# Patient Record
Sex: MALE | Race: WHITE
[De-identification: names, ages, dates, MRNs, and addresses within clinical notes are randomized per-mention and may not be internally consistent; named-entity substitution may affect disease eponyms.]

---

## 2017-04-21 ENCOUNTER — HOSPITAL ENCOUNTER (EMERGENCY)
Dept: HOSPITAL 82 - ED | Age: 77
Discharge: HOME | End: 2017-04-21
Payer: MEDICARE

## 2017-04-21 VITALS — BODY MASS INDEX: 32.72 KG/M2 | HEIGHT: 71 IN | WEIGHT: 233.69 LBS

## 2017-04-21 VITALS — SYSTOLIC BLOOD PRESSURE: 161 MMHG | DIASTOLIC BLOOD PRESSURE: 97 MMHG

## 2017-04-21 DIAGNOSIS — S81.812A: Primary | ICD-10-CM

## 2017-04-21 DIAGNOSIS — R94.31: ICD-10-CM
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
## 2017-04-21 DIAGNOSIS — I10: ICD-10-CM
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
## 2017-04-21 LAB
ALBUMIN SERPL-MCNC: 4.4 G/DL (ref 3.2–5)
ALP SERPL-CCNC: 56 U/L (ref 38–126)
ALT SERPL-CCNC: 25 U/L (ref 11–66)
ANION GAP SERPL CALCULATED.3IONS-SCNC: 17 MMOL/L
AST SERPL-CCNC: 22 U/L (ref 19–48)
BASOPHILS NFR BLD AUTO: 1 % (ref 0–3)
BUN SERPL-MCNC: 13 MG/DL (ref 8–23)
BUN/CREAT SERPL: 15
CALCIUM SERPL-MCNC: 9.7 MG/DL (ref 8.4–10.2)
CHLORIDE SERPL-SCNC: 104 MMOL/L (ref 95–108)
CO2 SERPL-SCNC: 23 MMOL/L (ref 22–30)
CREAT SERPL-MCNC: 0.9 MG/DL (ref 0.7–1.3)
EOSINOPHIL NFR BLD AUTO: 1 % (ref 0–8)
ERYTHROCYTE [DISTWIDTH] IN BLOOD BY AUTOMATED COUNT: 13.8 % (ref 11.5–15.5)
GLUCOSE SERPL-MCNC: 104 MG/DL (ref 82–115)
HCT VFR BLD AUTO: 58.2 % (ref 39–50)
HGB BLD-MCNC: 19.9 G/DL (ref 14–18)
IMM GRANULOCYTES NFR BLD: 0.5 % (ref 0–1)
LYMPHOCYTES NFR BLD: 30 % (ref 15–41)
MCH RBC QN AUTO: 31.3 PG  CALC (ref 26–32)
MCHC RBC AUTO-ENTMCNC: 34.2 G/L CALC (ref 32–36)
MCV RBC AUTO: 91.7 FL  CALC (ref 80–100)
MONOCYTES NFR BLD AUTO: 12 % (ref 2–13)
MYOGLOBIN SERPL-MCNC: 52 NG/ML (ref 0–121)
NEUTROPHILS # BLD AUTO: 4.55 THOU/UL (ref 1.82–7.42)
NEUTROPHILS NFR BLD AUTO: 55 % (ref 42–76)
PLATELET # BLD AUTO: 156 THOU/UL (ref 130–400)
POTASSIUM SERPL-SCNC: 4 MMOL/L (ref 3.5–5.1)
PROT SERPL-MCNC: 7.8 G/DL (ref 6.3–8.2)
RBC # BLD AUTO: 6.35 MILL/UL (ref 4.7–6.1)
SODIUM SERPL-SCNC: 140 MMOL/L (ref 137–146)

## 2017-04-21 PROCEDURE — 0JQP0ZZ REPAIR LEFT LOWER LEG SUBCUTANEOUS TISSUE AND FASCIA, OPEN APPROACH: ICD-10-PCS | Performed by: EMERGENCY MEDICINE

## 2017-04-22 ENCOUNTER — HOSPITAL ENCOUNTER (EMERGENCY)
Dept: HOSPITAL 82 - ED | Age: 77
Discharge: HOME | End: 2017-04-22
Payer: MEDICARE

## 2017-04-22 VITALS — HEIGHT: 71 IN | WEIGHT: 235.45 LBS | BODY MASS INDEX: 32.96 KG/M2

## 2017-04-22 VITALS — DIASTOLIC BLOOD PRESSURE: 86 MMHG | SYSTOLIC BLOOD PRESSURE: 143 MMHG

## 2017-04-22 DIAGNOSIS — I10: ICD-10-CM
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
## 2017-04-22 DIAGNOSIS — S81.812D: Primary | ICD-10-CM

## 2017-04-28 ENCOUNTER — HOSPITAL ENCOUNTER (EMERGENCY)
Dept: HOSPITAL 82 - ED | Age: 77
Discharge: HOME | End: 2017-04-28
Payer: MEDICARE

## 2017-04-28 VITALS — WEIGHT: 233.69 LBS | HEIGHT: 71 IN | BODY MASS INDEX: 32.72 KG/M2

## 2017-04-28 VITALS — SYSTOLIC BLOOD PRESSURE: 123 MMHG | DIASTOLIC BLOOD PRESSURE: 77 MMHG

## 2017-04-28 DIAGNOSIS — S81.812D: Primary | ICD-10-CM

## 2017-04-28 DIAGNOSIS — L08.9: ICD-10-CM
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
## 2017-04-28 DIAGNOSIS — M79.662: ICD-10-CM

## 2017-04-28 DIAGNOSIS — M79.89: ICD-10-CM

## 2020-04-25 ENCOUNTER — HOSPITAL ENCOUNTER (OUTPATIENT)
Dept: HOSPITAL 82 - ED | Age: 80
Setting detail: OBSERVATION
LOS: 2 days | Discharge: HOME | End: 2020-04-27
Attending: INTERNAL MEDICINE | Admitting: INTERNAL MEDICINE
Payer: MEDICARE

## 2020-04-25 VITALS — SYSTOLIC BLOOD PRESSURE: 114 MMHG | DIASTOLIC BLOOD PRESSURE: 66 MMHG

## 2020-04-25 VITALS — DIASTOLIC BLOOD PRESSURE: 71 MMHG | SYSTOLIC BLOOD PRESSURE: 140 MMHG

## 2020-04-25 VITALS — WEIGHT: 202.83 LBS | BODY MASS INDEX: 28.4 KG/M2 | HEIGHT: 71 IN

## 2020-04-25 DIAGNOSIS — E03.9: ICD-10-CM

## 2020-04-25 DIAGNOSIS — N39.0: Primary | ICD-10-CM

## 2020-04-25 DIAGNOSIS — I10: ICD-10-CM

## 2020-04-25 DIAGNOSIS — B96.89: ICD-10-CM

## 2020-04-25 DIAGNOSIS — Z20.828: ICD-10-CM

## 2020-04-25 LAB
ALBUMIN SERPL-MCNC: 4.4 G/DL (ref 3.2–5)
ALP SERPL-CCNC: 71 U/L (ref 38–126)
ANION GAP SERPL CALCULATED.3IONS-SCNC: 15 MMOL/L
AST SERPL-CCNC: 50 U/L (ref 19–48)
BACTERIA #/AREA URNS HPF: (no result) HPF
BASOPHILS NFR BLD AUTO: 0 % (ref 0–3)
BILIRUB UR QL STRIP.AUTO: NEGATIVE
BUN SERPL-MCNC: 13 MG/DL (ref 8–23)
BUN/CREAT SERPL: 16
CHLORIDE SERPL-SCNC: 103 MMOL/L (ref 95–108)
CO2 SERPL-SCNC: 20 MMOL/L (ref 22–30)
COLOR UR AUTO: YELLOW
CREAT SERPL-MCNC: 0.8 MG/DL (ref 0.7–1.3)
EOSINOPHIL NFR BLD AUTO: 0 % (ref 0–8)
ERYTHROCYTE [DISTWIDTH] IN BLOOD BY AUTOMATED COUNT: 14.4 % (ref 11.5–15.5)
GLUCOSE UR STRIP.AUTO-MCNC: NEGATIVE MG/DL
HCT VFR BLD AUTO: 52.1 % (ref 39–50)
HGB BLD-MCNC: 17.6 G/DL (ref 14–18)
HGB UR QL STRIP.AUTO: (no result)
IMM GRANULOCYTES NFR BLD: 0.3 % (ref 0–5)
KETONES UR STRIP.AUTO-MCNC: (no result) MG/DL
LEUKOCYTE ESTERASE UR QL STRIP.AUTO: (no result)
LYMPHOCYTES NFR BLD: 9 % (ref 15–41)
MCH RBC QN AUTO: 30.7 PG  CALC (ref 26–32)
MCHC RBC AUTO-ENTMCNC: 33.8 G/DL CAL (ref 32–36)
MCV RBC AUTO: 90.8 FL  CALC (ref 80–100)
MONOCYTES NFR BLD AUTO: 9 % (ref 2–13)
NEUTROPHILS # BLD AUTO: 12.99 THOU/UL (ref 1.82–7.42)
NEUTROPHILS NFR BLD AUTO: 82 % (ref 42–76)
NITRITE UR QL STRIP.AUTO: POSITIVE
PH UR STRIP.AUTO: 6 [PH] (ref 4.5–8)
PLATELET # BLD AUTO: 198 THOU/UL (ref 130–400)
POTASSIUM SERPL-SCNC: 4.1 MMOL/L (ref 3.5–5.1)
PROT SERPL-MCNC: 7.5 G/DL (ref 6.3–8.2)
PROT UR QL STRIP.AUTO: NEGATIVE MG/DL
RBC # BLD AUTO: 5.74 MILL/UL (ref 4.7–6.1)
SODIUM SERPL-SCNC: 134 MMOL/L (ref 137–146)
SP GR UR STRIP.AUTO: 1.02
SQUAMOUS URNS QL MICRO: (no result) EPI/HPF
UROBILINOGEN UR QL STRIP.AUTO: 0.2 E.U./DL
WBC #/AREA URNS HPF: (no result) WBC/HPF (ref 0–5)

## 2020-04-25 PROCEDURE — G0378 HOSPITAL OBSERVATION PER HR: HCPCS

## 2020-04-25 NOTE — NUR
PATIENT ADMITTED FROM ER VIA WHEELCHAIR WITH ER STAFF IN ATTENDACE. PATIENT
ABLE TO TRANSFER TO BED. FACE IS FLUSHED. AWAKE ALERT AND ORIENTEDX3. PATIENT
PLACED ON ISOLATION IN NEG PRESSURE ROOM. RAPID IGM AND IgG NEG BUT PATIENT
WITH FEVER. NASAL SWAB WAS DONE WITH NO RESULTS AT THIS TIME. PATIENT WITH
IV SITE TO LEFT HAND. AZITHROMYCIN INFUSING AS ORDERED, SITE IS HEALTHY AT
THIS TIME. PATIENT STATES THAT HE STARTED WITH HIGH FEVER THIS AFTERNOON AND
CAME TO THE ER. ASSESSMENT COMPLETED. PATIENT ORIENTED TO ROOM AND
SURROUNDINGS. INSTRUCTED ON USE OF NURSE CALL LIGHT SYSTEM, TV REMOTE AND
PHONE. SAFETY PRECAUTIONS REINFORCED. CALL LIGHT IN REACH. WILL CONT TO
MONITOR.

## 2020-04-25 NOTE — NUR
A/O M WITH SUDDEN FEVER TODAY  AT HOME AND NP COUGH NO SORE THROAT NO
DYSPNEA NO DIFF SWALLOWING.TOOK NO ANTIPYRETICS BUT DID TAKE ZITHROMAX AND
PLAQUENIL AT NOON.W/FR FACE AND DRY SKIN.CLEAR BILAT BREATH SOUNDS NON-SMOKER.

## 2020-04-26 VITALS — SYSTOLIC BLOOD PRESSURE: 141 MMHG | DIASTOLIC BLOOD PRESSURE: 76 MMHG

## 2020-04-26 VITALS — SYSTOLIC BLOOD PRESSURE: 113 MMHG | DIASTOLIC BLOOD PRESSURE: 68 MMHG

## 2020-04-26 VITALS — SYSTOLIC BLOOD PRESSURE: 150 MMHG | DIASTOLIC BLOOD PRESSURE: 85 MMHG

## 2020-04-26 VITALS — DIASTOLIC BLOOD PRESSURE: 82 MMHG | SYSTOLIC BLOOD PRESSURE: 142 MMHG

## 2020-04-26 LAB
ANION GAP SERPL CALCULATED.3IONS-SCNC: 11 MMOL/L
BASOPHILS NFR BLD AUTO: 0 % (ref 0–3)
BUN SERPL-MCNC: 14 MG/DL (ref 8–23)
BUN/CREAT SERPL: 16
CHLORIDE SERPL-SCNC: 104 MMOL/L (ref 95–108)
CO2 SERPL-SCNC: 25 MMOL/L (ref 22–30)
CREAT SERPL-MCNC: 0.9 MG/DL (ref 0.7–1.3)
EOSINOPHIL NFR BLD AUTO: 0 % (ref 0–8)
ERYTHROCYTE [DISTWIDTH] IN BLOOD BY AUTOMATED COUNT: 14.6 % (ref 11.5–15.5)
HCT VFR BLD AUTO: 50.5 % (ref 39–50)
HGB BLD-MCNC: 16.5 G/DL (ref 14–18)
IMM GRANULOCYTES NFR BLD: 0.5 % (ref 0–5)
LYMPHOCYTES NFR BLD: 10 % (ref 15–41)
MCH RBC QN AUTO: 30.3 PG  CALC (ref 26–32)
MCHC RBC AUTO-ENTMCNC: 32.7 G/DL CAL (ref 32–36)
MCV RBC AUTO: 92.8 FL  CALC (ref 80–100)
MONOCYTES NFR BLD AUTO: 10 % (ref 2–13)
NEUTROPHILS # BLD AUTO: 9.96 THOU/UL (ref 1.82–7.42)
NEUTROPHILS NFR BLD AUTO: 80 % (ref 42–76)
PLATELET # BLD AUTO: 161 THOU/UL (ref 130–400)
POTASSIUM SERPL-SCNC: 3.9 MMOL/L (ref 3.5–5.1)
RBC # BLD AUTO: 5.44 MILL/UL (ref 4.7–6.1)
SODIUM SERPL-SCNC: 136 MMOL/L (ref 137–146)

## 2020-04-26 NOTE — NUR
PT LAYING IN BED. NO DISTRESS NOTED. NO OTHER NEEDS AT THIS TIME. CALL LIGHT
IN REACH. CONTINUE TO MONITOR.

## 2020-04-26 NOTE — NUR
PATIENT RESTING IN BED-MEDICATED WITH ROCEPHIN AS ORDERED VIA LEFT AC IV SITE.
SITE IS HEALTHY AT THIS TIME. NO COMPLAINTS AT THIS TIME. CALL LIGHT IN REACH.
WILL CONT TO MONITOR.

## 2020-04-26 NOTE — NUR
PATIENT RESTING IN BED AT THIS TIME-STATES THAT HE IS FEELING BETTER THIS
MPORNING. AFEBRILE THIS MORNING. IVF PATENT AND INFUSING VIA LAC SITE AS
ORDERED. LAB WORK DRAWN VIA RAC SITE WITH GOOD BLOOD RETURN, ISOLATION AND NEG
PRESSURE ROOM RMAINS IN PLACE. CALL LIGHT IN REACH. WILL CONT TO MONITOR.

## 2020-04-26 NOTE — NUR
PATIENT APPEARS SLEEPING POSITIONED ON LEFT SIDE. RESP ARE EVEN AND UNLABORED.
VOIDED 200CC OF ANUP URINE IN URINAL. IVF PATENT ANDINFUSING VIA LAC AS
ORDERED. CALL LIGHT IN REACH. WILL CONT TO MONITOR.

## 2020-04-26 NOTE — NUR
PATIENT RESTING IN BED AT THIS TIME.  AWAKE ALERT AND ORIENTEDX3 WITH NO
COMPLAINTS OR CONCERNS AT THIS TIME. VS TAKEN AND RECORDED. AFEBRILE AT THIS
TIME. VOIDING YELLOW  URINE IN URINAL.  IVF NS PATENT AND INFUSING VIA LEFT
HAND SITE A5T 125CC/HR. SAFETY PRECUAITONS REINFORCED. CALL LIGHT IN REACH.
WILL CONT TO MONITOR.

## 2020-04-26 NOTE — NUR
PT SLEEPING IN BED AWAKENED TO COMPLETE ASSESSMENT. A&O X3. NO DISTRESS NOTED,
O2 VIA NC @2L IN PLACE. PER PT HE IS NOT O2 DEPENDENT AT HOME. ALSO STATES
THAT HE WOULD PREFER TO SPEAK TO THE PHYSICIAN IN REGARDS TO CONTINUING HIS
PLAQUENIL. HE STATES THAT HE STARTED TAKING IT YESTERDAY DUE TO HIS SYMPTOMS.
NARCOLEPSY EPISODE NOTED. PT ENGAGED BACK INTO CONVERSATION SHORTLY AFTER. PT
STATES THAT HE WOULD PREFER TO BE WITHOUT O2, O2 % 95 WITHOUT O2. O2 LEFT AT
BEDSIDE IN CASE IT IS NEEDED. ASSESSMENT COMPLETED. DISCUSSED POC. CALL LIGHT
IN REACH. CONTINUE TO MONITOR. ISOLATION PERCAUTIONS IN PLACE AND EXPLAINED.

## 2020-04-26 NOTE — NUR
WIFE BROUGHT IN PRESCRIPTION OF LISBETH THYROID MEDICATION THAT PT TAKES EVERY
DAY. MEDICATION TO BE SENT TO PHARMACY TO BE PROFILED.

## 2020-04-27 VITALS — SYSTOLIC BLOOD PRESSURE: 144 MMHG | DIASTOLIC BLOOD PRESSURE: 85 MMHG

## 2020-04-27 VITALS — DIASTOLIC BLOOD PRESSURE: 67 MMHG | SYSTOLIC BLOOD PRESSURE: 119 MMHG

## 2020-04-27 VITALS — DIASTOLIC BLOOD PRESSURE: 85 MMHG | SYSTOLIC BLOOD PRESSURE: 144 MMHG

## 2020-04-27 NOTE — NUR
PT AWAKE, ALERT, ORIENTED X 3.  LUNGS CLEAR, DECREASED BASES.  NO ELEVATED
TEMPERATURE, 98.5.  PT FEELS BETTER.
PT HAS BEEN DISCHARGED TO HOME.  PT VERBALIZED UNDERSTANDING OF DC
INSTRUCTIONS, TAKEN TO BENCH OUTSIDE BY WHEELCHAIR TO WAIT FOR HIS WIFE.  PT
LEAVES [facility] IN STABLE CONDITION.

## 2020-04-27 NOTE — NUR
PATIENT RESTING IN BED-POSITIONED ON RIGHT SITE-APPEARS SLEEPING WITH EYES
CLOSED.  RESP ARE EVEN AND UNLABORED. IVF NS PATENT AND INFUSING VIA LEFT AC
SITE AT 125CC/HR. SITE IS HEALTHY. VOIDING QS YELLOW URINE IN URINAL. CALL
LIGHT IN REACH. WILL CONT TO MONITOR.

## 2020-04-28 NOTE — NUR
Notifed patient of Covid results (Negative). Advised patient to follow up with
PCP or return to ED for urgent needs. Advised patient to continue practicing
Covid prevention. Patient verbalized understanding.

## 2022-05-21 ENCOUNTER — HOSPITAL ENCOUNTER (INPATIENT)
Dept: HOSPITAL 82 - ED | Age: 82
LOS: 2 days | Discharge: HOME | DRG: 336 | End: 2022-05-23
Attending: INTERNAL MEDICINE | Admitting: INTERNAL MEDICINE
Payer: MEDICARE

## 2022-05-21 VITALS — SYSTOLIC BLOOD PRESSURE: 112 MMHG | DIASTOLIC BLOOD PRESSURE: 75 MMHG

## 2022-05-21 VITALS — SYSTOLIC BLOOD PRESSURE: 128 MMHG | DIASTOLIC BLOOD PRESSURE: 90 MMHG

## 2022-05-21 VITALS — SYSTOLIC BLOOD PRESSURE: 99 MMHG | DIASTOLIC BLOOD PRESSURE: 68 MMHG

## 2022-05-21 VITALS — WEIGHT: 253.56 LBS | HEIGHT: 71 IN | BODY MASS INDEX: 35.5 KG/M2

## 2022-05-21 VITALS — SYSTOLIC BLOOD PRESSURE: 127 MMHG | DIASTOLIC BLOOD PRESSURE: 89 MMHG

## 2022-05-21 VITALS — DIASTOLIC BLOOD PRESSURE: 77 MMHG | SYSTOLIC BLOOD PRESSURE: 106 MMHG

## 2022-05-21 DIAGNOSIS — Z20.822: ICD-10-CM

## 2022-05-21 DIAGNOSIS — B96.1: ICD-10-CM

## 2022-05-21 DIAGNOSIS — N39.0: ICD-10-CM

## 2022-05-21 DIAGNOSIS — Z87.440: ICD-10-CM

## 2022-05-21 DIAGNOSIS — E03.9: ICD-10-CM

## 2022-05-21 DIAGNOSIS — N17.9: ICD-10-CM

## 2022-05-21 DIAGNOSIS — G47.419: ICD-10-CM

## 2022-05-21 DIAGNOSIS — J40: ICD-10-CM

## 2022-05-21 DIAGNOSIS — K56.50: Primary | ICD-10-CM

## 2022-05-21 DIAGNOSIS — E86.0: ICD-10-CM

## 2022-05-21 DIAGNOSIS — Z90.49: ICD-10-CM

## 2022-05-21 DIAGNOSIS — I10: ICD-10-CM

## 2022-05-21 LAB
ALBUMIN SERPL-MCNC: 4.4 G/DL (ref 3.2–5)
ALP SERPL-CCNC: 83 U/L (ref 38–126)
AMYLASE SERPL-CCNC: 69 U/L (ref 30–110)
ANION GAP SERPL CALCULATED.3IONS-SCNC: 17 MMOL/L
APTT PPP: 24.2 SECONDS (ref 20–32.5)
AST SERPL-CCNC: 26 U/L (ref 19–48)
BASOPHILS NFR BLD AUTO: 0 % (ref 0–3)
BUN SERPL-MCNC: 14 MG/DL (ref 8–23)
BUN/CREAT SERPL: 8
CHLORIDE SERPL-SCNC: 101 MMOL/L (ref 95–108)
CO2 SERPL-SCNC: 26 MMOL/L (ref 22–30)
CREAT SERPL-MCNC: 1.7 MG/DL (ref 0.7–1.3)
D DIMER PPP FEU-MCNC: 4.02 MG/L (ref 0.19–0.6)
EOSINOPHIL NFR BLD AUTO: 0 % (ref 0–8)
ERYTHROCYTE [DISTWIDTH] IN BLOOD BY AUTOMATED COUNT: 19.8 % (ref 11.5–15.5)
HCT VFR BLD AUTO: 56.8 % (ref 39–50)
HGB BLD-MCNC: 18.5 G/DL (ref 14–18)
IMM GRANULOCYTES NFR BLD: 0.3 % (ref 0–5)
INR PPP: 1.3 RATIO (ref 0.7–1.3)
LIPASE SERPL-CCNC: 109 U/L (ref 23–300)
LYMPHOCYTES NFR BLD: 14 % (ref 15–41)
MCH RBC QN AUTO: 27.9 PG  CALC (ref 26–32)
MCHC RBC AUTO-ENTMCNC: 32.6 G/DL CAL (ref 32–36)
MCV RBC AUTO: 85.8 FL  CALC (ref 80–100)
MONOCYTES NFR BLD AUTO: 7 % (ref 2–13)
MYOGLOBIN SERPL-MCNC: 126 NG/ML (ref 0–121)
NEUTROPHILS # BLD AUTO: 12.28 THOU/UL (ref 1.82–7.42)
NEUTROPHILS NFR BLD AUTO: 78 % (ref 42–76)
PLATELET # BLD AUTO: 212 THOU/UL (ref 130–400)
POTASSIUM SERPL-SCNC: 4.3 MMOL/L (ref 3.5–5.1)
PROT SERPL-MCNC: 8.2 G/DL (ref 6.3–8.2)
PROTHROMBIN TIME: 13.4 SECONDS (ref 9–12.5)
RBC # BLD AUTO: 6.62 MILL/UL (ref 4.7–6.1)
SODIUM SERPL-SCNC: 140 MMOL/L (ref 137–146)

## 2022-05-22 VITALS — DIASTOLIC BLOOD PRESSURE: 69 MMHG | SYSTOLIC BLOOD PRESSURE: 138 MMHG

## 2022-05-22 VITALS — SYSTOLIC BLOOD PRESSURE: 133 MMHG | DIASTOLIC BLOOD PRESSURE: 80 MMHG

## 2022-05-22 VITALS — DIASTOLIC BLOOD PRESSURE: 73 MMHG | SYSTOLIC BLOOD PRESSURE: 115 MMHG

## 2022-05-22 VITALS — DIASTOLIC BLOOD PRESSURE: 100 MMHG | SYSTOLIC BLOOD PRESSURE: 163 MMHG

## 2022-05-22 VITALS — DIASTOLIC BLOOD PRESSURE: 56 MMHG | SYSTOLIC BLOOD PRESSURE: 118 MMHG

## 2022-05-22 VITALS — SYSTOLIC BLOOD PRESSURE: 124 MMHG | DIASTOLIC BLOOD PRESSURE: 80 MMHG

## 2022-05-22 VITALS — DIASTOLIC BLOOD PRESSURE: 70 MMHG | SYSTOLIC BLOOD PRESSURE: 113 MMHG

## 2022-05-22 VITALS — DIASTOLIC BLOOD PRESSURE: 84 MMHG | SYSTOLIC BLOOD PRESSURE: 163 MMHG

## 2022-05-22 VITALS — SYSTOLIC BLOOD PRESSURE: 124 MMHG | DIASTOLIC BLOOD PRESSURE: 64 MMHG

## 2022-05-22 VITALS — SYSTOLIC BLOOD PRESSURE: 130 MMHG | DIASTOLIC BLOOD PRESSURE: 70 MMHG

## 2022-05-22 VITALS — SYSTOLIC BLOOD PRESSURE: 134 MMHG | DIASTOLIC BLOOD PRESSURE: 73 MMHG

## 2022-05-22 VITALS — DIASTOLIC BLOOD PRESSURE: 107 MMHG | SYSTOLIC BLOOD PRESSURE: 146 MMHG

## 2022-05-22 VITALS — DIASTOLIC BLOOD PRESSURE: 70 MMHG | SYSTOLIC BLOOD PRESSURE: 118 MMHG

## 2022-05-22 VITALS — SYSTOLIC BLOOD PRESSURE: 131 MMHG | DIASTOLIC BLOOD PRESSURE: 88 MMHG

## 2022-05-22 VITALS — DIASTOLIC BLOOD PRESSURE: 74 MMHG | SYSTOLIC BLOOD PRESSURE: 132 MMHG

## 2022-05-22 VITALS — DIASTOLIC BLOOD PRESSURE: 72 MMHG | SYSTOLIC BLOOD PRESSURE: 135 MMHG

## 2022-05-22 VITALS — SYSTOLIC BLOOD PRESSURE: 114 MMHG | DIASTOLIC BLOOD PRESSURE: 84 MMHG

## 2022-05-22 VITALS — SYSTOLIC BLOOD PRESSURE: 130 MMHG | DIASTOLIC BLOOD PRESSURE: 68 MMHG

## 2022-05-22 VITALS — DIASTOLIC BLOOD PRESSURE: 83 MMHG | SYSTOLIC BLOOD PRESSURE: 148 MMHG

## 2022-05-22 LAB
ALBUMIN SERPL-MCNC: 3.8 G/DL (ref 3.2–5)
ALP SERPL-CCNC: 63 U/L (ref 38–126)
ANION GAP SERPL CALCULATED.3IONS-SCNC: 13 MMOL/L
AST SERPL-CCNC: 20 U/L (ref 19–48)
BACTERIA #/AREA URNS HPF: (no result) HPF
BASOPHILS NFR BLD AUTO: 0 % (ref 0–3)
BILIRUB UR QL STRIP.AUTO: NEGATIVE
BUN SERPL-MCNC: 19 MG/DL (ref 8–23)
BUN/CREAT SERPL: 14
CHLORIDE SERPL-SCNC: 103 MMOL/L (ref 95–108)
CO2 SERPL-SCNC: 29 MMOL/L (ref 22–30)
COLOR UR AUTO: (no result)
CREAT SERPL-MCNC: 1.3 MG/DL (ref 0.7–1.3)
EOSINOPHIL NFR BLD AUTO: 0 % (ref 0–8)
ERYTHROCYTE [DISTWIDTH] IN BLOOD BY AUTOMATED COUNT: 19.9 % (ref 11.5–15.5)
GLUCOSE UR STRIP.AUTO-MCNC: NEGATIVE MG/DL
HCT VFR BLD AUTO: 52.4 % (ref 39–50)
HGB BLD-MCNC: 16.8 G/DL (ref 14–18)
HGB UR QL STRIP.AUTO: (no result)
IMM GRANULOCYTES NFR BLD: 0.2 % (ref 0–5)
KETONES UR STRIP.AUTO-MCNC: (no result) MG/DL
LEUKOCYTE ESTERASE UR QL STRIP.AUTO: (no result)
LYMPHOCYTES NFR BLD: 13 % (ref 15–41)
MCH RBC QN AUTO: 27.8 PG  CALC (ref 26–32)
MCHC RBC AUTO-ENTMCNC: 32.1 G/DL CAL (ref 32–36)
MCV RBC AUTO: 86.8 FL  CALC (ref 80–100)
MONOCYTES NFR BLD AUTO: 9 % (ref 2–13)
NEUTROPHILS # BLD AUTO: 10.42 THOU/UL (ref 1.82–7.42)
NEUTROPHILS NFR BLD AUTO: 77 % (ref 42–76)
NITRITE UR QL STRIP.AUTO: POSITIVE
PH UR STRIP.AUTO: 6.5 [PH] (ref 4.5–8)
PLATELET # BLD AUTO: 206 THOU/UL (ref 130–400)
POTASSIUM SERPL-SCNC: 4.5 MMOL/L (ref 3.5–5.1)
PROT SERPL-MCNC: 6.8 G/DL (ref 6.3–8.2)
PROT UR QL STRIP.AUTO: 30 MG/DL
RBC # BLD AUTO: 6.04 MILL/UL (ref 4.7–6.1)
RBC #/AREA URNS HPF: (no result) RBC/HPF (ref 0–5)
SODIUM SERPL-SCNC: 140 MMOL/L (ref 137–146)
SP GR UR STRIP.AUTO: <=1.005
SQUAMOUS URNS QL MICRO: (no result) EPI/HPF
UROBILINOGEN UR QL STRIP.AUTO: 0.2 E.U./DL
WBC #/AREA URNS HPF: (no result) WBC/HPF (ref 0–5)

## 2022-05-22 PROCEDURE — 0DN84ZZ RELEASE SMALL INTESTINE, PERCUTANEOUS ENDOSCOPIC APPROACH: ICD-10-PCS | Performed by: SURGERY

## 2022-05-23 VITALS — SYSTOLIC BLOOD PRESSURE: 119 MMHG | DIASTOLIC BLOOD PRESSURE: 75 MMHG

## 2022-05-23 VITALS — DIASTOLIC BLOOD PRESSURE: 68 MMHG | SYSTOLIC BLOOD PRESSURE: 130 MMHG

## 2022-05-23 VITALS — DIASTOLIC BLOOD PRESSURE: 61 MMHG | SYSTOLIC BLOOD PRESSURE: 135 MMHG

## 2022-05-23 VITALS — SYSTOLIC BLOOD PRESSURE: 135 MMHG | DIASTOLIC BLOOD PRESSURE: 61 MMHG

## 2022-05-23 LAB
ALBUMIN SERPL-MCNC: 3 G/DL (ref 3.2–5)
ALP SERPL-CCNC: 48 U/L (ref 38–126)
ANION GAP SERPL CALCULATED.3IONS-SCNC: 10 MMOL/L
AST SERPL-CCNC: 21 U/L (ref 19–48)
BASOPHILS NFR BLD AUTO: 0 % (ref 0–3)
BUN SERPL-MCNC: 14 MG/DL (ref 8–23)
BUN/CREAT SERPL: 15
CHLORIDE SERPL-SCNC: 110 MMOL/L (ref 95–108)
CO2 SERPL-SCNC: 23 MMOL/L (ref 22–30)
CREAT SERPL-MCNC: 1 MG/DL (ref 0.7–1.3)
EOSINOPHIL NFR BLD AUTO: 1 % (ref 0–8)
ERYTHROCYTE [DISTWIDTH] IN BLOOD BY AUTOMATED COUNT: 19.7 % (ref 11.5–15.5)
HCT VFR BLD AUTO: 46.5 % (ref 39–50)
HGB BLD-MCNC: 14.4 G/DL (ref 14–18)
IMM GRANULOCYTES NFR BLD: 0.2 % (ref 0–5)
LYMPHOCYTES NFR BLD: 21 % (ref 15–41)
MAGNESIUM SERPL-MCNC: 2 MG/DL (ref 1.6–2.3)
MCH RBC QN AUTO: 27.6 PG  CALC (ref 26–32)
MCHC RBC AUTO-ENTMCNC: 31 G/DL CAL (ref 32–36)
MCV RBC AUTO: 89.3 FL  CALC (ref 80–100)
MONOCYTES NFR BLD AUTO: 11 % (ref 2–13)
NEUTROPHILS # BLD AUTO: 5.77 THOU/UL (ref 1.82–7.42)
NEUTROPHILS NFR BLD AUTO: 67 % (ref 42–76)
PLATELET # BLD AUTO: 177 THOU/UL (ref 130–400)
POTASSIUM SERPL-SCNC: 4.3 MMOL/L (ref 3.5–5.1)
PROT SERPL-MCNC: 5.8 G/DL (ref 6.3–8.2)
RBC # BLD AUTO: 5.21 MILL/UL (ref 4.7–6.1)
SODIUM SERPL-SCNC: 139 MMOL/L (ref 137–146)

## 2023-01-14 ENCOUNTER — HOSPITAL ENCOUNTER (EMERGENCY)
Dept: HOSPITAL 82 - ED | Age: 83
Discharge: HOME | End: 2023-01-14
Payer: MEDICARE

## 2023-01-14 VITALS — DIASTOLIC BLOOD PRESSURE: 79 MMHG | SYSTOLIC BLOOD PRESSURE: 134 MMHG

## 2023-01-14 VITALS — BODY MASS INDEX: 34.97 KG/M2 | HEIGHT: 71 IN | WEIGHT: 249.78 LBS

## 2023-01-14 VITALS — SYSTOLIC BLOOD PRESSURE: 164 MMHG | DIASTOLIC BLOOD PRESSURE: 89 MMHG

## 2023-01-14 VITALS — DIASTOLIC BLOOD PRESSURE: 100 MMHG | SYSTOLIC BLOOD PRESSURE: 170 MMHG

## 2023-01-14 VITALS — SYSTOLIC BLOOD PRESSURE: 184 MMHG | DIASTOLIC BLOOD PRESSURE: 97 MMHG

## 2023-01-14 DIAGNOSIS — E03.9: ICD-10-CM

## 2023-01-14 DIAGNOSIS — R06.6: ICD-10-CM

## 2023-01-14 DIAGNOSIS — Z20.822: ICD-10-CM

## 2023-01-14 DIAGNOSIS — I10: ICD-10-CM

## 2023-01-14 DIAGNOSIS — J11.00: Primary | ICD-10-CM

## 2023-01-14 LAB
ALBUMIN SERPL-MCNC: 4.1 G/DL (ref 3.2–5)
ALP SERPL-CCNC: 68 U/L (ref 38–126)
ANION GAP SERPL CALCULATED.3IONS-SCNC: 9 MMOL/L
AST SERPL-CCNC: 36 U/L (ref 19–48)
BASOPHILS NFR BLD AUTO: 0.3 % (ref 0–3)
BILIRUB UR QL STRIP.AUTO: NEGATIVE
BUN SERPL-MCNC: 11 MG/DL (ref 8–23)
BUN/CREAT SERPL: 13
CHLORIDE SERPL-SCNC: 103 MMOL/L (ref 95–108)
CO2 SERPL-SCNC: 29 MMOL/L (ref 22–30)
COLOR UR AUTO: YELLOW
CREAT SERPL-MCNC: 0.9 MG/DL (ref 0.7–1.3)
EOSINOPHIL NFR BLD AUTO: 0.3 % (ref 0–8)
ERYTHROCYTE [DISTWIDTH] IN BLOOD BY AUTOMATED COUNT: 14.8 % (ref 11.5–15.5)
GLUCOSE UR STRIP.AUTO-MCNC: NEGATIVE MG/DL
HCT VFR BLD AUTO: 50.5 % (ref 39–50)
HGB BLD-MCNC: 17.4 G/DL (ref 14–18)
HGB UR QL STRIP.AUTO: (no result)
IMM GRANULOCYTES NFR BLD: 0.1 % (ref 0–5)
KETONES UR STRIP.AUTO-MCNC: 40 MG/DL
LEUKOCYTE ESTERASE UR QL STRIP.AUTO: NEGATIVE
LYMPHOCYTES NFR BLD: 13.2 % (ref 15–41)
MCH RBC QN AUTO: 33 PG  CALC (ref 26–32)
MCHC RBC AUTO-ENTMCNC: 34.5 G/DL CAL (ref 32–36)
MCV RBC AUTO: 95.8 FL  CALC (ref 80–100)
MONOCYTES NFR BLD AUTO: 19.9 % (ref 2–13)
NEUTROPHILS # BLD AUTO: 4.47 THOU/UL (ref 1.82–7.42)
NEUTROPHILS NFR BLD AUTO: 66.2 % (ref 42–76)
NITRITE UR QL STRIP.AUTO: NEGATIVE
PH UR STRIP.AUTO: 6.5 [PH] (ref 4.5–8)
PLATELET # BLD AUTO: 142 THOU/UL (ref 130–400)
POTASSIUM SERPL-SCNC: 4.1 MMOL/L (ref 3.5–5.1)
PROT SERPL-MCNC: 7.4 G/DL (ref 6.3–8.2)
PROT UR QL STRIP.AUTO: NEGATIVE MG/DL
RBC # BLD AUTO: 5.27 MILL/UL (ref 4.7–6.1)
SODIUM SERPL-SCNC: 137 MMOL/L (ref 137–146)
SP GR UR STRIP.AUTO: 1.02
UROBILINOGEN UR QL STRIP.AUTO: 0.2 E.U./DL